# Patient Record
Sex: MALE | Race: WHITE | Employment: FULL TIME | URBAN - METROPOLITAN AREA
[De-identification: names, ages, dates, MRNs, and addresses within clinical notes are randomized per-mention and may not be internally consistent; named-entity substitution may affect disease eponyms.]

---

## 2017-10-24 ENCOUNTER — APPOINTMENT (OUTPATIENT)
Dept: CT IMAGING | Facility: CLINIC | Age: 37
End: 2017-10-24
Attending: EMERGENCY MEDICINE
Payer: COMMERCIAL

## 2017-10-24 ENCOUNTER — HOSPITAL ENCOUNTER (EMERGENCY)
Facility: CLINIC | Age: 37
Discharge: HOME | End: 2017-10-24
Attending: EMERGENCY MEDICINE | Admitting: EMERGENCY MEDICINE
Payer: COMMERCIAL

## 2017-10-24 VITALS
WEIGHT: 215 LBS | BODY MASS INDEX: 30.1 KG/M2 | HEIGHT: 71 IN | RESPIRATION RATE: 20 BRPM | SYSTOLIC BLOOD PRESSURE: 144 MMHG | HEART RATE: 98 BPM | TEMPERATURE: 98 F | OXYGEN SATURATION: 98 % | DIASTOLIC BLOOD PRESSURE: 127 MMHG

## 2017-10-24 DIAGNOSIS — S22.089A CLOSED FRACTURE OF TWELFTH THORACIC VERTEBRA, UNSPECIFIED FRACTURE MORPHOLOGY, INITIAL ENCOUNTER (H): ICD-10-CM
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
## 2017-10-24 DIAGNOSIS — S32.010A CLOSED COMPRESSION FRACTURE OF FIRST LUMBAR VERTEBRA, INITIAL ENCOUNTER: ICD-10-CM

## 2017-10-24 PROCEDURE — 25000128 H RX IP 250 OP 636: Performed by: EMERGENCY MEDICINE

## 2017-10-24 PROCEDURE — 72125 CT NECK SPINE W/O DYE: CPT

## 2017-10-24 PROCEDURE — 72131 CT LUMBAR SPINE W/O DYE: CPT

## 2017-10-24 PROCEDURE — 96375 TX/PRO/DX INJ NEW DRUG ADDON: CPT

## 2017-10-24 PROCEDURE — 99285 EMERGENCY DEPT VISIT HI MDM: CPT | Mod: 25

## 2017-10-24 PROCEDURE — 96374 THER/PROPH/DIAG INJ IV PUSH: CPT

## 2017-10-24 PROCEDURE — 96376 TX/PRO/DX INJ SAME DRUG ADON: CPT

## 2017-10-24 PROCEDURE — 72128 CT CHEST SPINE W/O DYE: CPT

## 2017-10-24 RX ORDER — CYCLOBENZAPRINE HCL 10 MG
10 TABLET ORAL 3 TIMES DAILY PRN
Qty: 20 TABLET | Refills: 1 | Status: SHIPPED | OUTPATIENT
Start: 2017-10-24

## 2017-10-24 RX ORDER — ONDANSETRON 2 MG/ML
4 INJECTION INTRAMUSCULAR; INTRAVENOUS EVERY 30 MIN PRN
Status: DISCONTINUED | OUTPATIENT
Start: 2017-10-24 | End: 2017-10-24 | Stop reason: HOSPADM

## 2017-10-24 RX ORDER — HYDROCODONE BITARTRATE AND ACETAMINOPHEN 5; 325 MG/1; MG/1
1-2 TABLET ORAL EVERY 6 HOURS PRN
Qty: 20 TABLET | Refills: 0 | Status: SHIPPED | OUTPATIENT
Start: 2017-10-24 | End: 2017-11-10

## 2017-10-24 RX ORDER — MORPHINE SULFATE 4 MG/ML
4 INJECTION, SOLUTION INTRAMUSCULAR; INTRAVENOUS
Status: DISCONTINUED | OUTPATIENT
Start: 2017-10-24 | End: 2017-10-24 | Stop reason: HOSPADM

## 2017-10-24 RX ADMIN — MORPHINE SULFATE 4 MG: 4 INJECTION, SOLUTION INTRAMUSCULAR; INTRAVENOUS at 09:51

## 2017-10-24 RX ADMIN — MORPHINE SULFATE 4 MG: 4 INJECTION, SOLUTION INTRAMUSCULAR; INTRAVENOUS at 10:53

## 2017-10-24 RX ADMIN — ONDANSETRON 4 MG: 2 INJECTION INTRAMUSCULAR; INTRAVENOUS at 09:48

## 2017-10-24 ASSESSMENT — ENCOUNTER SYMPTOMS
ARTHRALGIAS: 0
DIAPHORESIS: 1
VOMITING: 0
NECK PAIN: 0
BACK PAIN: 1
NECK STIFFNESS: 0
HEADACHES: 0
NAUSEA: 0
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
## 2017-10-24 NOTE — ED NOTES
Regular c-collar placed with assistance of 2 nurses.  C-spines held while collar sized and put on to patient.  Patient denies numbness and tingling in extremities after collar placed.
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
## 2017-10-24 NOTE — ED NOTES
Bed: ED09  Expected date: 10/24/17  Expected time: 8:43 AM  Means of arrival: Ambulance  Comments:  [phone]

## 2017-10-24 NOTE — ED AVS SNAPSHOT
[facility] Emergency Department    [address]    [facility] [address]    Phone:  [phone]    Fax:  [phone]                                       Obed Burt   MRN: [id number]    Department:  [facility] Emergency Department   Date of Visit:  10/24/2017           After Visit Summary Signature Page     I have received my discharge instructions, and my questions have been answered. I have discussed any challenges I see with this plan with the nurse or doctor.    ..........................................................................................................................................  Patient/Patient Representative Signature      ..........................................................................................................................................  Patient Representative Print Name and Relationship to Patient    ..................................................               ................................................  Date                                            Time    ..........................................................................................................................................  Reviewed by Signature/Title    ...................................................              ..............................................  Date                                                            Time

## 2017-10-24 NOTE — LETTER
To Whom it may concern:      Obed Burt was seen in our Emergency Department today, 10/24/17.  I expect his condition to improve over the next 5 days.  He may return to work/school when improved, but may have limited work ability due to having to wear TLSO spine brace for the next 6 weeks while upright.      Sincerely,        Corinne Bai MD

## 2017-10-24 NOTE — LETTER
To Whom it may concern:      Obed Burt was seen in our Emergency Department today, 10/24/17.  I expect his condition to improve over the next 5 days.  He may not lift anything over 10 pounds and he must be allowed to wear his TLSO brace while working.  He may return to work/school when improved.    Sincerely,        Kat Kim RN
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
## 2017-11-02 DIAGNOSIS — S22.089A: ICD-10-CM

## 2017-11-02 DIAGNOSIS — S32.010A CLOSED COMPRESSION FRACTURE OF FIRST LUMBAR VERTEBRA (H): Primary | ICD-10-CM

## 2017-11-10 ENCOUNTER — OFFICE VISIT (OUTPATIENT)
Dept: FAMILY MEDICINE | Facility: CLINIC | Age: 37
End: 2017-11-10
Payer: COMMERCIAL

## 2017-11-10 VITALS
DIASTOLIC BLOOD PRESSURE: 88 MMHG | RESPIRATION RATE: 14 BRPM | HEART RATE: 84 BPM | SYSTOLIC BLOOD PRESSURE: 136 MMHG | BODY MASS INDEX: 28.73 KG/M2 | WEIGHT: 206 LBS | TEMPERATURE: 98 F

## 2017-11-10 DIAGNOSIS — S32.010A CLOSED COMPRESSION FRACTURE OF FIRST LUMBAR VERTEBRA, INITIAL ENCOUNTER: ICD-10-CM

## 2017-11-10 DIAGNOSIS — S22.089A CLOSED FRACTURE OF TWELFTH THORACIC VERTEBRA, UNSPECIFIED FRACTURE MORPHOLOGY, INITIAL ENCOUNTER (H): Primary | ICD-10-CM

## 2017-11-10 PROCEDURE — 99203 OFFICE O/P NEW LOW 30 MIN: CPT | Performed by: NURSE PRACTITIONER

## 2017-11-10 NOTE — PATIENT INSTRUCTIONS
Keep follow up appointment with neurosurgery.  If any worsening symptoms will need to be seen sooner.
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
## 2017-11-10 NOTE — LETTER
November 10, 2017      Obed Burt  [address] [facility] [address]        To Whom It May Concern:    Obed Burt was seen in our clinic. He may return to work with the following: no lifting more than 10 pounds.  Must be in TSLO back brace.        Sincerely,        SHWETA Guillen CNP

## 2017-11-10 NOTE — MR AVS SNAPSHOT
After Visit Summary   11/10/2017    Obed Burt    MRN: [id number]           Patient Information     Date Of Birth          1980        Visit Information        Provider Department      11/10/2017 1:20 PM Dyana Andrew APRN CNP [facility]        Today's Diagnoses     Closed fracture of twelfth thoracic vertebra, unspecified fracture morphology, initial encounter (H)    -  1    Closed compression fracture of first lumbar vertebra, initial encounter (H)          Care Instructions    Keep follow up appointment with neurosurgery.  If any worsening symptoms will need to be seen sooner.           Follow-ups after your visit        Your next 10 appointments already scheduled     Nov 27, 2017  9:40 AM CST   XR THORACOLUMBAR SPINE 2 VIEWS with [id number]   [facility] ([facility])    [address] [facility]  [address] [place] [id number]   [phone]           Please bring a list of your current medicines to your exam. (Include vitamins, minerals and over-thecounter medicines.) Leave your valuables at home.  Tell your doctor if there is a chance you may be pregnant.  You do not need to do anything special for this exam.            Nov 27, 2017 10:20 AM CST   New Visit with SWHETA Wilks CNP   [facility] ([facility])    [address] [facility] [address] [place] [id number] [phone]              Who to contact     If you have questions or need follow up information about today's clinic visit or your schedule please contact [facility] directly at [phone].  Normal or non-critical lab and imaging results will be communicated to you by MyChart, letter or phone within 4 business days after the clinic has received the results. If you do not hear from us within 7 days, please contact the clinic through MyChart or phone. If you have a critical or abnormal lab result, we  "will notify you by phone as soon as possible.  Submit refill requests through Joturl or call your pharmacy and they will forward the refill request to us. Please allow 3 business days for your refill to be completed.          Additional Information About Your Visit        [employer] Mobile App[employer]art Information     Joturl lets you send messages to your doctor, view your test results, renew your prescriptions, schedule appointments and more. To sign up, go to [email or web address] . Click on \"Log in\" on the left side of the screen, which will take you to the Welcome page. Then click on \"Sign up Now\" on the right side of the page.     You will be asked to enter the access code listed below, as well as some personal information. Please follow the directions to create your username and password.     Your access code is: [email or web address]  Expires: 2018 12:14 PM     Your access code will  in 90 days. If you need help or a new code, please call your [facility] clinic or [phone].        Care EveryWhere ID     This is your Care EveryWhere ID. This could be used by other organizations to access your [facility] medical records  [id number]        Your Vitals Were     Pulse Temperature Respirations BMI (Body Mass Index)          84 98  F (36.7  C) (Oral) 14 28.73 kg/m2         Blood Pressure from Last 3 Encounters:   11/10/17 136/88   10/24/17 (!) 144/127   13 117/70    Weight from Last 3 Encounters:   11/10/17 206 lb (93.4 kg)   10/24/17 215 lb (97.5 kg)   13 204 lb 9.4 oz (92.8 kg)              Today, you had the following     No orders found for display       Primary Care Provider    Physician No Ref-Primary       NO REF-PRIMARY PHYSICIAN        Equal Access to Services     GUILLERMINA CUNNINGHAM : Melissa Buck, jin pedersen, mason kaalluis felipe gibson, manjit lo. So [facility] [phone].    ATENCIÓN: Si habla español, tiene a gooden disposición servicios gratuitos de asistencia " lingüísticaTeddy Jones al [phone].    We comply with applicable federal civil rights laws and [place] laws. We do not discriminate on the basis of race, color, national origin, age, disability, sex, sexual orientation, or gender identity.            Thank you!     Thank you for choosing [facility]  for your care. Our goal is always to provide you with excellent care. Hearing back from our patients is one way we can continue to improve our services. Please take a few minutes to complete the written survey that you may receive in the mail after your visit with us. Thank you!             Your Updated Medication List - Protect others around you: Learn how to safely use, store and throw away your medicines at www.disposemymeds.org.          This list is accurate as of: 11/10/17  2:20 PM.  Always use your most recent med list.                   Brand Name Dispense Instructions for use Diagnosis    cyclobenzaprine 10 MG tablet    FLEXERIL    20 tablet    Take 1 tablet (10 mg) by mouth 3 times daily as needed for muscle spasms

## 2021-05-25 ENCOUNTER — HOSPITAL ENCOUNTER (EMERGENCY)
Facility: CLINIC | Age: 41
Discharge: HOME | End: 2021-05-25
Attending: EMERGENCY MEDICINE | Admitting: EMERGENCY MEDICINE
Payer: COMMERCIAL

## 2021-05-25 VITALS
SYSTOLIC BLOOD PRESSURE: 130 MMHG | RESPIRATION RATE: 18 BRPM | TEMPERATURE: 97 F | OXYGEN SATURATION: 99 % | HEART RATE: 88 BPM | DIASTOLIC BLOOD PRESSURE: 82 MMHG

## 2021-05-25 DIAGNOSIS — S61.309A NAIL AVULSION, FINGER, INITIAL ENCOUNTER: ICD-10-CM

## 2021-05-25 PROCEDURE — 11730 AVULSION NAIL PLATE SIMPLE 1: CPT | Mod: F5

## 2021-05-25 PROCEDURE — 272N000047 HC ADHESIVE DERMABOND SKIN

## 2021-05-25 PROCEDURE — 99283 EMERGENCY DEPT VISIT LOW MDM: CPT | Mod: 25

## 2021-05-26 NOTE — ED TRIAGE NOTES
Patient reports washing a water bottle and jammed R thumb hitting nail. Now nail is bent backwards,

## 2021-05-26 NOTE — ED PROVIDER NOTES
History   Chief Complaint:  Thumb Pain     HPI   Obed Burt is a 40 year old male who presents with thumb pain.  Patient is a 40-year-old right-hand-dominant male who states he was working in the sink using some type of a brush and brush got under his thumbnail and avulsed the nail upward.  Patient attempted to put it back but it was too tender presents with family for further assessment.  Patient denies other injury tetanus is up-to-date.  Patient's last tetanus was in 2018.     Review of Systems  Positive for right distal thumb pain positive for deformity of the nail all the systems negative except as above    Allergies:  No known drug allergies    Medications:  Flexeril     Past Medical History:    Lateral Epicondylitis  Rotator Cuff Syndrome  Tobacco Abuse     Past Surgical History:    Appendectomy    Social History:  Positive for smoking negative for drug abuse    Physical Exam     Patient Vitals for the past 24 hrs:   BP Temp Pulse Resp SpO2   05/25/21 2058 130/82 -- -- -- --   05/25/21 2051 -- 97  F (36.1  C) 88 18 99 %       Physical Exam  Cardiovascular:      Rate and Rhythm: Normal rate.   Pulmonary:      Effort: Pulmonary effort is normal.   Musculoskeletal:      Comments: Right thumb: There is complete avulsion of the nail from the nailbed down to the nail matrix there is a 25% avulsion of the proximal nail out of the nail matrix without complete avulsion.  No large hematoma no suturable injury.   Neurological:      Mental Status: He is alert.         Emergency Department Course     Procedures  Digital Block  LOCATION:  Right Thumb  ANESTHESIA: Regional block using 2% Lidocaine (what anesthetic), total of 6 (volume) mLs  PROCEDURE NOTE: The patient tolerated the procedure well with good relief of discomfort and there were no complications.  Nail was replaced into the nail matrix.  Using Dermabond the nail was glued down to the both lateral medial folds of the nail as well as the most proximal nail  bed.  Patient tolerated Dermabond well without complication patient will be discharged in stable condition.    Emergency Department Course:    Reviewed:  I reviewed nursing notes, vitals and past medical history    Assessments:  2110 I obtained history and examined the patient as noted above.    2134 I attempted to realign the patient's nail.     Disposition:  The patient was discharged to home.     Impression & Plan     Medical Decision Making:  Patient presents with thumb nail avulsion.  This was replaced by me and glued into place.  No suturable injury tetanus is up-to-date patient was offered discharge and follow-up as indicated.  No concern for nailbed laceration or fracture distal tuft fracture.      Diagnosis:    ICD-10-CM    1. Nail avulsion, finger, initial encounter  S61.309A        Scribe Disclosure:  I, Maximino Hicks, am serving as a scribe at 9:07 PM on 5/25/2021 to document services personally performed by Parish Samuels MD based on my observations and the provider's statements to me.              Parish Samuels MD  05/26/21 0853

## 2021-05-26 NOTE — DISCHARGE INSTRUCTIONS
We have replaced the nail and glued it down. Return with concern for infection (hot red skin or pus draining). Keep finger tip protected using finger cage. Nail will eventually fall off and new nail will grow from under neath. Thanks for your patience tonight.

## 2022-02-17 PROBLEM — S32.010A CLOSED COMPRESSION FRACTURE OF L1 VERTEBRA, INITIAL ENCOUNTER (H): Status: ACTIVE | Noted: 2017-11-10

## (undated) RX ORDER — LIDOCAINE HYDROCHLORIDE 10 MG/ML
INJECTION, SOLUTION INFILTRATION; PERINEURAL
Status: DISPENSED
Start: 2021-05-25